# Patient Record
Sex: FEMALE | Race: WHITE | Employment: FULL TIME | URBAN - METROPOLITAN AREA
[De-identification: names, ages, dates, MRNs, and addresses within clinical notes are randomized per-mention and may not be internally consistent; named-entity substitution may affect disease eponyms.]

---

## 2023-02-08 ENCOUNTER — OFFICE VISIT (OUTPATIENT)
Dept: OCCUPATIONAL MEDICINE | Age: 23
End: 2023-02-08

## 2023-02-08 VITALS
OXYGEN SATURATION: 98 % | WEIGHT: 130 LBS | BODY MASS INDEX: 21.66 KG/M2 | TEMPERATURE: 99.2 F | HEIGHT: 65 IN | SYSTOLIC BLOOD PRESSURE: 116 MMHG | RESPIRATION RATE: 18 BRPM | HEART RATE: 85 BPM | DIASTOLIC BLOOD PRESSURE: 76 MMHG

## 2023-02-08 DIAGNOSIS — R52 BODY ACHES: ICD-10-CM

## 2023-02-08 DIAGNOSIS — J02.9 SORE THROAT: Primary | ICD-10-CM

## 2023-02-08 DIAGNOSIS — U07.1 COVID-19: ICD-10-CM

## 2023-02-08 PROBLEM — Z86.16 HISTORY OF COVID-19: Status: ACTIVE | Noted: 2023-02-08

## 2023-02-08 LAB — SARS-COV-2, POC: DETECTED

## 2023-02-08 ASSESSMENT — ENCOUNTER SYMPTOMS
SHORTNESS OF BREATH: 0
DIARRHEA: 0
ABDOMINAL PAIN: 0
VOMITING: 0
SORE THROAT: 1
NAUSEA: 0
COUGH: 0
RHINORRHEA: 0
SINUS PAIN: 0
VOICE CHANGE: 0
TROUBLE SWALLOWING: 0

## 2023-02-08 NOTE — PATIENT INSTRUCTIONS
Patient Education        Learning About Coronavirus [phone])  What is coronavirus (COVID-19)? COVID-19 is a disease caused by a type of coronavirus. This illness was first found in December 2019. It has since spread worldwide. Coronaviruses are a large group of viruses. They cause the common cold. They also cause more serious illnesses like [place] respiratory syndrome (MERS) and severe acute respiratory syndrome (SARS). COVID-19 is caused by a novel coronavirus. That means it's a new type that has not been seen in people before. What are the symptoms? COVID-19 symptoms may include:  Fever. Cough. Trouble breathing. Chills or repeated shaking with chills. Muscle and body aches. Headache. Sore throat. New loss of taste or smell. Vomiting. Diarrhea. In severe cases, COVID-19 can cause pneumonia and make it hard to breathe without help from a machine. It can cause death. How is it diagnosed? COVID-19 is diagnosed with a viral test. This may also be called a PCR test or antigen test. It looks for evidence of the virus in your breathing passages or lungs (respiratory system). The test is most often done on a sample from the nose, throat, or lungs. It's sometimes done on a sample of saliva. One way a sample is collected is by putting a long swab into the back of your nose. If you have questions about COVID-19 testing, ask your doctor or go to cdc.gov to use the COVID-19 Viral Testing Tool. How is it treated? Mild cases of COVID-19 can be treated at home. Serious cases need treatment in the hospital. Treatment may include medicines, plus breathing support such as oxygen therapy or a ventilator. Some people may be placed on their belly to help their oxygen levels. Treatments that may help people who have COVID-19 include:  Antiviral medicines. These medicines treat viral infections. Immune-based therapy. These medicines help the immune system fight COVID-19.  Examples include monoclonal antibodies. Blood thinners. These medicines help prevent blood clots. People with severe illness are at risk for blood clots. How can you protect yourself and others? How can you protect yourself and others from COVID-19? Stay up to date on your COVID-19 vaccines. Avoid sick people, and stay away from others if you are sick. Keep some physical distance between yourself and other people. Avoid crowds, especially indoors. Wear a mask with the best fit, protection, and comfort for you. A mask can help protect you even when others aren't wearing one. Get tested for COVID-19 before you have an indoor visit with people who don't live with you. Improve airflow. If you have to spend time indoors with others, open windows and doors. Or you can use a fan to blow air away from people and out a window. Choose outdoor visits and activities when possible. Cover your mouth with a tissue when you cough or sneeze. Wash your hands often. Avoid touching your mouth, nose, and eyes. Here are some other steps you may need to take. If you were exposed to someone with COVID-19 AND you don't have symptoms:  For at least 10 full days, wear a high-quality mask when you are around other people, even those you live with. Get tested. Do it right away if you develop symptoms. Wait at least 5 days after you were exposed if you don't have symptoms. If your test is positive, call your doctor right away. The doctor may have you take a medicine to keep you from getting seriously ill. Treatment works best when started early. And isolate right away. Take extra care if you have to be around other people who are at high risk of getting seriously ill from COVID-19. Keep some extra space between yourself and others, for example. And don't travel. Watch for symptoms. If you are sick or test positive for COVID-19:   Talk to your doctor as soon as you can. Your doctor may have you take medicine to help prevent serious illness.   Get a COVID-19 test unless you have already been tested. You may need to be tested more than once. Stay home and separate yourself from others, including those you live with. Limit contact with people in your home. If possible, stay in a separate bedroom and use a separate bathroom. For at least 10 full days, anytime you're around other people, you and they should wear a high-quality mask. Children younger than 3years old don't need to wear a mask. Self-isolate until it's safe to be around others again. (Important: Day 0 is the day your symptoms started or the day you tested positive. Day 1 is the day after your symptoms first started or your test was positive.)  If you tested positive but had no symptoms, it's safe to end isolation at the end of Day 5. But if you start to have symptoms, follow the recommendations below and count your first day of symptoms as Day 0. If you have symptoms, when you can end isolation depends on how sick you were and your overall health. No matter what, you need to wait until your symptoms are getting better and you haven't had a fever for 24 hours while not taking medicines to lower the fever. Here's how long to isolate, based on your symptoms:  If you were only a little sick: (This means you might have felt really bad but had no shortness of breath and never needed to be in the hospital.) You can end isolation at the end of Day 5. If you were more sick: (You had some shortness of breath or some trouble breathing but never needed to be in the hospital.) You can end isolation at the end of Day 10. If you were very sick and needed to be in the hospital, or if you have a weakened immune system: You can end isolation at the end of Day 10 or later. Talk to your doctor to find out when it's safe to end isolation. You may need a viral test.  After you end isolation, if your symptoms come back or get worse: Restart your isolation at Day 0.  Do this even if it happens after you took medicine for COVID. Avoid travel and stay away from people at high risk for serious disease for at least 10 days. Check the CDC website at cdc.gov for the most current information on how to protect yourself. How can you self-isolate when you have COVID-19? If you have COVID-19, there are things you can do to help avoid spreading the virus to others. Stay home, and avoid contact with other people. Limit contact with people in your home. If possible, stay in a separate bedroom and use a separate bathroom. Wear a high-quality mask when you are around other people. Improve airflow. If you have to spend time indoors with others, open windows and doors. Or you can use a fan to blow air away from people and out a window. Avoid contact with pets and other animals. Cover your mouth and nose with a tissue when you cough or sneeze. Then throw it in the trash right away. Wash your hands often, especially after you cough or sneeze. Use soap and water, and scrub for at least 20 seconds. If soap and water aren't available, use an alcohol-based hand . Don't share personal household items. These include bedding, towels, cups and glasses, and eating utensils. [address] in the warmest water allowed for the fabric type, and dry it completely. It's okay to wash other people's laundry with yours. Clean and disinfect your home. Use household  and disinfectant wipes or sprays. Go to the [facility] website at cdc.gov if you have questions. When should you call for help? Call 911 anytime you think you may need emergency care. For example, call if you have life-threatening symptoms, such as: You have severe trouble breathing. (You can't talk at all.)     You have constant chest pain or pressure. You are severely dizzy or lightheaded. You are confused or can't think clearly. You have pale, gray, or blue-colored skin or lips. You pass out (lose consciousness) or are very hard to wake up.      You have loss of balance or trouble walking. You have trouble seeing out of one or both eyes. You have weakness or drooping on one side of the face. You have weakness or numbness in an arm or a leg. You have trouble speaking. You have a severe headache. You have a seizure. Call your doctor now or seek immediate medical care if:    You have moderate trouble breathing. (You can't speak a full sentence.)     You are coughing up blood. You have signs of low blood pressure. These include feeling lightheaded; being too weak to stand; and having cold, pale, clammy skin. Watch closely for changes in your health, and be sure to contact your doctor if:    Your symptoms get worse. You are not getting better as expected. You have new or worse symptoms of anxiety, depression, nightmares, or flashbacks. Call before you go to the doctor's office. Follow their instructions. And wear a mask. Where can you learn more? Go to http://www.woods.com/ and enter C008 to learn more about \"Learning About Coronavirus (COVID-19). \"  Current as of: October 28, 2022               Content Version: 13.5  © 5936-6067 [employer]. Care instructions adapted under license by [facility] ([facility]). If you have questions about a medical condition or this instruction, always ask your healthcare professional. [address] any warranty or liability for your use of this information.

## 2023-02-08 NOTE — PROGRESS NOTES
[facility]  [address]  [phone]    SUBJECTIVE:     Kayla Kahn is a 25 y.o. female     Student reports sore throat, HA, body aches, congestion since yesterday morning. She has not treated s/s. No current outpatient medications on file. No Known Allergies    Review of Systems   Constitutional:  Negative for chills and fever. HENT:  Positive for congestion and sore throat. Negative for rhinorrhea, sinus pain, sneezing, trouble swallowing and voice change. Respiratory:  Negative for cough and shortness of breath. Cardiovascular:  Negative for chest pain. Gastrointestinal:  Negative for abdominal pain, diarrhea, nausea and vomiting. OBJECTIVE:    /76 (Site: Right Upper Arm, Position: Sitting)   Pulse 85   Temp 99.2 °F (37.3 °C) (Temporal)   Resp 18   Ht 5' 5\" (1.651 m)   Wt 130 lb (59 kg)   LMP 02/05/2023 (Exact Date)   SpO2 98%   BMI 21.63 kg/m²      Physical Exam  Vitals reviewed. Constitutional:       Appearance: Normal appearance. HENT:      Head: Normocephalic and atraumatic. Right Ear: Hearing, tympanic membrane and ear canal normal.      Left Ear: Hearing, tympanic membrane and ear canal normal.   Cardiovascular:      Rate and Rhythm: Normal rate and regular rhythm. Pulmonary:      Effort: Pulmonary effort is normal.      Breath sounds: Normal breath sounds. Neurological:      Mental Status: She is alert and oriented to person, place, and time. Psychiatric:         Mood and Affect: Mood normal.         Behavior: Behavior normal.        Results for orders placed or performed in visit on 02/08/23   AMB POC SARS-COV-2   Result Value Ref Range    SARS-COV-2, POC Detected (A) Not Detected          ASSESSMENT and PLAN         Diagnosis Orders   1. Sore throat  AMB POC SARS-COV-2      2. Body aches  AMB POC SARS-COV-2      3. COVID-19              Recommendations: COVID test is positive.  Patient returned home locally [place] for isolation period. Encouraged rest, increased oral fluids, OTC cold medications per package instructions, and monitoring for worsening symptoms that may warrant immediate reevaluation. Alerted patient that results will be reported to [facility]. Discussed self-isolating per CDC guidelines after testing positive which states: date of start of symptoms is day 0 (2/7). You must isolate on days 1-5 (2/8 to 2/12). You can end isolation after day 5 but must wear a well-fitting mask for 10 full total days any time you are around others inside your home or in public (through end of 5/81). Do not go to places where you are unable to wear a mask. In addition:   -You must be fever free (100.3 or less) for at least 24 hours , without fever-reducing medication and  -Other symptoms of COVID-19 are improving   **Loss of taste and smell and other symptoms may persist for weeks or months after recovery and need not delay the end of isolation**    Advised to follow up with own PCP to update PCP of current COVID diagnosis and follow up with them for any outstanding needs. 911/ER for any emergent s/s including but not limited to shortness of breath, chest pain, fever that does not respond to OTC medicines, changes in mental alertness. *Counseling provided on benefits of having a primary care provider which includes, but is not limited to, continuity of care and having a medical home when routine and emergent health needs arise. Also reminded patient that onsite clinic policy states that we are not to take the place of a primary care provider. Patient verbalized understanding. *Side effects, adverse effects, risks versus benefits associated with medications prescribed/recommended were discussed with the patient. Patient verbalized understanding and agrees with treatment plan. All questions answered.     * I have reviewed the patient's medication list, past medical, family, social, and surgical history and updated the patient record appropriately        Social History     Socioeconomic History    Marital status: Not on file     Spouse name: Not on file    Number of children: Not on file    Years of education: Not on file    Highest education level: Not on file   Occupational History    Not on file   Tobacco Use    Smoking status: Never    Smokeless tobacco: Never   Substance and Sexual Activity    Alcohol use: Never    Drug use: Never    Sexual activity: Never   Other Topics Concern    Not on file   Social History Narrative    Not on file     Social Determinants of Health     Financial Resource Strain: Not on file   Food Insecurity: Not on file   Transportation Needs: Not on file   Physical Activity: Not on file   Stress: Not on file   Social Connections: Not on file   Intimate Partner Violence: Not on file   Housing Stability: Not on file     Past Medical History:   Diagnosis Date    History of COVID-19     1/2022 & 2/2023     No past surgical history on file. Family History   Problem Relation Age of Onset    Heart Disease Father     Heart Disease Maternal Grandfather     Stroke Paternal Grandfather      There are no Patient Instructions on file for this visit.

## 2023-02-13 ENCOUNTER — TELEPHONE (OUTPATIENT)
Dept: OCCUPATIONAL MEDICINE | Age: 23
End: 2023-02-13

## 2023-02-13 NOTE — TELEPHONE ENCOUNTER
f/u from recent [facility] clinic visit on 2/8/23: Student states she is \"feeling much better, was able to resume classes this morning\". No acute needs verbalized.